# Patient Record
Sex: FEMALE | Race: BLACK OR AFRICAN AMERICAN | NOT HISPANIC OR LATINO | Employment: FULL TIME | URBAN - METROPOLITAN AREA
[De-identification: names, ages, dates, MRNs, and addresses within clinical notes are randomized per-mention and may not be internally consistent; named-entity substitution may affect disease eponyms.]

---

## 2017-08-03 ENCOUNTER — TRANSFERRED RECORDS (OUTPATIENT)
Dept: HEALTH INFORMATION MANAGEMENT | Facility: CLINIC | Age: 27
End: 2017-08-03

## 2017-08-29 DIAGNOSIS — H53.10 SUBJECTIVE VISUAL DISTURBANCE: Primary | ICD-10-CM

## 2017-09-15 ENCOUNTER — OFFICE VISIT (OUTPATIENT)
Dept: OPHTHALMOLOGY | Facility: CLINIC | Age: 27
End: 2017-09-15
Attending: OPHTHALMOLOGY
Payer: COMMERCIAL

## 2017-09-15 DIAGNOSIS — H53.10 SUBJECTIVE VISUAL DISTURBANCE: ICD-10-CM

## 2017-09-15 DIAGNOSIS — H47.333 PSEUDOPAPILLEDEMA, BILATERAL: Primary | ICD-10-CM

## 2017-09-15 PROCEDURE — 92133 CPTRZD OPH DX IMG PST SGM ON: CPT | Mod: ZF | Performed by: OPHTHALMOLOGY

## 2017-09-15 PROCEDURE — 92083 EXTENDED VISUAL FIELD XM: CPT | Mod: ZF | Performed by: OPHTHALMOLOGY

## 2017-09-15 PROCEDURE — 99215 OFFICE O/P EST HI 40 MIN: CPT | Mod: ZF

## 2017-09-15 ASSESSMENT — EXTERNAL EXAM - RIGHT EYE: OD_EXAM: NORMAL

## 2017-09-15 ASSESSMENT — EXTERNAL EXAM - LEFT EYE: OS_EXAM: NORMAL

## 2017-09-15 ASSESSMENT — CONF VISUAL FIELD
OD_NORMAL: 1
OS_NORMAL: 1

## 2017-09-15 ASSESSMENT — VISUAL ACUITY
OD_PH_SC: 20/40
OS_PH_SC: 20/40
METHOD: SNELLEN - LINEAR
OD_SC: 20/400
OS_SC: 20/300

## 2017-09-15 ASSESSMENT — SLIT LAMP EXAM - LIDS
COMMENTS: NORMAL
COMMENTS: NORMAL

## 2017-09-15 ASSESSMENT — TONOMETRY
OS_IOP_MMHG: 16
OD_IOP_MMHG: 15
IOP_METHOD: ICARE

## 2017-09-15 ASSESSMENT — CUP TO DISC RATIO
OD_RATIO: 0.2
OS_RATIO: 0.2

## 2017-09-15 NOTE — MR AVS SNAPSHOT
After Visit Summary   9/15/2017    Mar Kwon    MRN: [id number]           Patient Information     Date Of Birth          1990        Visit Information        Provider Department      9/15/2017 9:00 AM Aditya [facility] MD [facility]        Today's Diagnoses     Subjective visual disturbance           Follow-ups after your visit        Your next 10 appointments already scheduled     2018  9:30 AM CST   RETURN NEURO with Aditya Weber MD   Eye Clinic ([facility])    [address]  [address]  [address]  [facility] [address]   [phone]              Who to contact     Please call your clinic at [phone] to:    Ask questions about your health    Make or cancel appointments    Discuss your medicines    Learn about your test results    Speak to your doctor   If you have compliments or concerns about an experience at your clinic, or if you wish to file a complaint, please contact [facility] Patient Relations at [phone] or email us at [email or web address]         Additional Information About Your Visit        MyChart Information     [employer] is an electronic gateway that provides easy, online access to your medical records. With [employer], you can request a clinic appointment, read your test results, renew a prescription or communicate with your care team.     To sign up for [employer] visit the website at [email or web address]   You will be asked to enter the access code listed below, as well as some personal information. Please follow the directions to create your username and password.     Your access code is: [email or web address]  Expires: 2017  6:30 AM     Your access code will  in 90 days. If you need help or a new code, please contact your [facility] or call [phone] for assistance.        Care EveryWhere ID     This is your Care EveryWhere ID.  This could be used by other organizations to access your [place] medical records  [id number]         Blood Pressure from Last 3 Encounters:   No data found for BP    Weight from Last 3 Encounters:   No data found for Wt              We Performed the Following     Glaucoma Top OU     IOP Measurement     OCT Optic Nerve RNFL Spectralis OU (both eyes)        Primary Care Provider    None Specified       No primary provider on file.        Equal Access to Services     JAYY [facility] CenterSEBASTIAN : Hadii billie mcadams hadkenao Sodanny, waaxda blakeqadaha, qangata kaalmada zaida, dwaine salguerojuanally andrea . So [place] [phone].    ATENCIÓN: Si habla español, tiene a sanders disposición servicios gratuitos de asistencia lingüística. Llame al [phone].    We comply with applicable federal civil rights laws and [place] laws. We do not discriminate on the basis of race, color, national origin, age, disability sex, sexual orientation or gender identity.            Thank you!     Thank you for choosing EYE CLINIC  for your care. Our goal is always to provide you with excellent care. Hearing back from our patients is one way we can continue to improve our services. Please take a few minutes to complete the written survey that you may receive in the mail after your visit with us. Thank you!             Your Updated Medication List - Protect others around you: Learn how to safely use, store and throw away your medicines at www.disposemymeds.org.      Notice  As of 9/15/2017 10:42 AM    You have not been prescribed any medications.

## 2017-09-15 NOTE — LETTER
2017    RE: Mar Kwon  : 1990  MRN: [id number]    Dear Dr. Maria,    Thank you for referring your patient, Mar Kwon, to my neuro-ophthalmology clinic recently.  After a thorough neuro-ophthalmic history and examination, I came to the following conclusions:     1. 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 Primary headache disorder      Mar is here referred by Dr. Crow O.D. For initial evaluation of mild optic nerve swelling of both eyes. She has been having headaches for 1-2 years. Headaches are worse with bending down. Headaches do not wake her up from sleep but can be present in the morning. No pulsatile tinnitus. No nausea. Vision will go blur in both eyes for a few seconds- not clear that position related. No double vision. She is otherwise healthy. No recent weight gain. She has severe photophobia with headaches.  Feels like just needs to lay down. She was not noted to have odd-looking nerves in the past eye examinations.    Afferent examination showed visual acuity of 20/40 with pinhole and no correction ( patient wears myopic correction but she does not have her glasses with her) with normal color vsion and no afferent pupillary defect. Efferent examination showed full motility. Slit lamp examination was normal. Dilated fundus examination was remarkable for elevated disc margins in both eyes. However, blood vessels could be seen clearly as they cross  The neuro-retinal rim without obscuration. Spontaneous venous pulsations were noted in both eyes.    Optical Coherence tomography of the retinal nerve fiber was normal in both eyes and automated static 30-2 visual field testing was full in the right eye and essentially full in the left eye.    Mar presents today with pseudo-papilledema in the setting of long-standing history of headaches. On further questioning, it appears that headaches are not as frequent. Spontaneous venous pulsations on exam are more reassurance that there  is no increased intracranial pressure. Will plan to follow up in about 3-4 months to ensure no change over time.    Check fundus autofluorescence next visit.    Again, thank you for trusting me with the care of your patient.  For further exam details, please feel free to contact our office for additional records.  If you wish to contact me regarding this patient please email me at [email or web address] or give my clinic a call to arrange a phone conversation.    Sincerely,    Aditya Weber MD  , Neuro-Ophthalmology and Adult Strabismus  Department of Ophthalmology and Visual Neurosciences  [facility]    DX: pseudo-papilledema, primary headache disorder

## 2017-09-15 NOTE — PROGRESS NOTES
1. 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 Primary headache disorder      Mar is here referred by Dr. Crow O.D. For initial evaluation of mild optic nerve swelling of both eyes. She has been having headaches for 1-2 years. Headaches are worse with bending down. Headaches do not wake her up from sleep but can be present in the morning. No pulsatile tinnitus. No nausea. Vision will go blur in both eyes for a few seconds- not clear that position related. No double vision. She is otherwise healthy. No recent weight gain. She has severe photophobia with headaches.  Feels like just needs to lay down. She was not noted to have odd-looking nerves in the past eye examinations.    Afferent examination showed visual acuity of 20/40 with pinhole and no correction ( patient wears myopic correction but she does not have her glasses with her) with normal color vsion and no afferent pupillary defect. Efferent examination showed full motility. Slit lamp examination was normal. Dilated fundus examination was remarkable for elevated disc margins in both eyes. However, blood vessels could be seen clearly as they cross  The neuro-retinal rim without obscuration. Spontaneous venous pulsations were noted in both eyes.    Optical Coherence tomography of the retinal nerve fiber was normal in both eyes and automated static 30-2 visual field testing was full in the right eye and essentially full in the left eye.    Mar presents today with pseudo-papilledema in the setting of long-standing history of headaches. On further questioning, it appears that headaches are not as frequent. Spontaneous venous pulsations on exam are more reassurance that there is no increased intracranial pressure. Will plan to follow up in about 3-4 months to ensure no change over time.    Check fundus autofluorescence next visit.       Complete documentation of historical and exam elements from today's encounter can be found in the full encounter summary report  (not reduplicated in this progress note).  I personally obtained the chief complaint(s) and history of present illness.  I confirmed and edited as necessary the review of systems, past medical/surgical history, family history, social history, and examination findings as documented by others; and I examined the patient myself.  I personally reviewed the relevant tests, images, and reports as documented above.  I formulated and edited as necessary the assessment and plan and discussed the findings and management plan with the patient and family     Aditya Weber MD

## 2017-09-15 NOTE — NURSING NOTE
Chief Complaints and History of Present Illnesses   Patient presents with     Neurologic Problem     NEW PT     HPI    Symptoms:              Comments:  Mar is a 27 year old female referred for possible ON edema.     Seen at [facility] (Dr. Maria) August 2017. rx was given, but told to hold off until she saw Dr. Weber  C/o headaches: feels like head is exploding. Pressure. History of longstanding headaches (far and few in between, but they're always bad). Attributes increased frequency to increased stress.   Occasional tinnitus.     aFrooq Mcdonald CO 8:51 AM September 15, 2017

## 2018-01-04 DIAGNOSIS — H47.333 PSEUDOPAPILLEDEMA OF BOTH OPTIC DISCS: Primary | ICD-10-CM

## 2023-04-20 ENCOUNTER — OFFICE VISIT (OUTPATIENT)
Dept: URGENT CARE | Facility: URGENT CARE | Age: 33
End: 2023-04-20

## 2023-04-20 ENCOUNTER — ANCILLARY PROCEDURE (OUTPATIENT)
Dept: GENERAL RADIOLOGY | Facility: CLINIC | Age: 33
End: 2023-04-20
Attending: PHYSICIAN ASSISTANT
Payer: COMMERCIAL

## 2023-04-20 VITALS
RESPIRATION RATE: 16 BRPM | SYSTOLIC BLOOD PRESSURE: 120 MMHG | OXYGEN SATURATION: 100 % | DIASTOLIC BLOOD PRESSURE: 81 MMHG | TEMPERATURE: 98.2 F | HEART RATE: 58 BPM

## 2023-04-20 DIAGNOSIS — M54.2 NECK PAIN: ICD-10-CM
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
## 2023-04-20 DIAGNOSIS — M62.838 NECK MUSCLE SPASM: ICD-10-CM

## 2023-04-20 DIAGNOSIS — M25.512 ACUTE PAIN OF LEFT SHOULDER: ICD-10-CM

## 2023-04-20 PROCEDURE — 73030 X-RAY EXAM OF SHOULDER: CPT | Mod: TC | Performed by: RADIOLOGY

## 2023-04-20 PROCEDURE — 72040 X-RAY EXAM NECK SPINE 2-3 VW: CPT | Mod: TC | Performed by: RADIOLOGY

## 2023-04-20 PROCEDURE — 99203 OFFICE O/P NEW LOW 30 MIN: CPT | Performed by: PHYSICIAN ASSISTANT

## 2023-04-20 RX ORDER — ACETAMINOPHEN 500 MG
1000 TABLET ORAL ONCE
Status: COMPLETED | OUTPATIENT
Start: 2023-04-20 | End: 2023-04-20

## 2023-04-20 RX ORDER — METHOCARBAMOL 750 MG/1
750 TABLET, FILM COATED ORAL 4 TIMES DAILY PRN
Qty: 30 TABLET | Refills: 0 | Status: SHIPPED | OUTPATIENT
Start: 2023-04-20 | End: 2023-04-21

## 2023-04-20 RX ORDER — IBUPROFEN 800 MG/1
800 TABLET, FILM COATED ORAL EVERY 8 HOURS PRN
Qty: 30 TABLET | Refills: 0 | Status: SHIPPED | OUTPATIENT
Start: 2023-04-20 | End: 2023-04-20

## 2023-04-20 RX ORDER — METHYLPREDNISOLONE 4 MG
TABLET, DOSE PACK ORAL
Qty: 21 TABLET | Refills: 0 | Status: SHIPPED | OUTPATIENT
Start: 2023-04-20 | End: 2023-04-21

## 2023-04-20 RX ADMIN — Medication 1000 MG: at 19:31

## 2023-04-21 RX ORDER — METHYLPREDNISOLONE 4 MG
TABLET, DOSE PACK ORAL
Qty: 21 TABLET | Refills: 0 | Status: SHIPPED | OUTPATIENT
Start: 2023-04-21

## 2023-04-21 RX ORDER — METHOCARBAMOL 750 MG/1
750 TABLET, FILM COATED ORAL 4 TIMES DAILY PRN
Qty: 30 TABLET | Refills: 0 | Status: SHIPPED | OUTPATIENT
Start: 2023-04-21
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
